# Patient Record
Sex: MALE | Race: WHITE
[De-identification: names, ages, dates, MRNs, and addresses within clinical notes are randomized per-mention and may not be internally consistent; named-entity substitution may affect disease eponyms.]

---

## 2017-01-26 ENCOUNTER — RESULT REVIEW (OUTPATIENT)
Age: 22
End: 2017-01-26

## 2017-01-30 ENCOUNTER — APPOINTMENT (OUTPATIENT)
Age: 22
End: 2017-01-30

## 2017-02-27 ENCOUNTER — APPOINTMENT (OUTPATIENT)
Dept: PULMONOLOGY | Facility: CLINIC | Age: 22
End: 2017-02-27

## 2017-02-27 VITALS
HEART RATE: 48 BPM | BODY MASS INDEX: 24.87 KG/M2 | RESPIRATION RATE: 17 BRPM | SYSTOLIC BLOOD PRESSURE: 120 MMHG | DIASTOLIC BLOOD PRESSURE: 80 MMHG | HEIGHT: 75 IN | OXYGEN SATURATION: 100 % | WEIGHT: 200 LBS

## 2017-02-27 RX ORDER — DESLORATADINE 5 MG/1
5 TABLET ORAL DAILY
Qty: 90 | Refills: 1 | Status: ACTIVE | COMMUNITY
Start: 2017-02-27 | End: 1900-01-01

## 2017-02-27 RX ORDER — TIOTROPIUM BROMIDE INHALATION SPRAY 3.12 UG/1
2.5 SPRAY, METERED RESPIRATORY (INHALATION) DAILY
Qty: 3 | Refills: 1 | Status: ACTIVE | COMMUNITY
Start: 2017-02-27 | End: 1900-01-01

## 2017-03-17 ENCOUNTER — APPOINTMENT (OUTPATIENT)
Dept: PULMONOLOGY | Facility: CLINIC | Age: 22
End: 2017-03-17

## 2017-04-11 ENCOUNTER — APPOINTMENT (OUTPATIENT)
Dept: PULMONOLOGY | Facility: CLINIC | Age: 22
End: 2017-04-11

## 2017-05-08 ENCOUNTER — APPOINTMENT (OUTPATIENT)
Dept: PULMONOLOGY | Facility: CLINIC | Age: 22
End: 2017-05-08

## 2017-05-08 VITALS
OXYGEN SATURATION: 100 % | HEART RATE: 48 BPM | SYSTOLIC BLOOD PRESSURE: 120 MMHG | DIASTOLIC BLOOD PRESSURE: 70 MMHG | BODY MASS INDEX: 25.67 KG/M2 | HEIGHT: 74 IN | WEIGHT: 200 LBS | RESPIRATION RATE: 17 BRPM

## 2017-05-08 RX ORDER — MONTELUKAST SODIUM 10 MG/1
10 TABLET, FILM COATED ORAL
Qty: 1 | Refills: 1 | Status: ACTIVE | COMMUNITY
Start: 2017-05-08 | End: 1900-01-01

## 2017-05-08 RX ORDER — DESLORATADINE 5 MG/1
5 TABLET ORAL DAILY
Qty: 90 | Refills: 1 | Status: ACTIVE | COMMUNITY
Start: 2017-05-08 | End: 1900-01-01

## 2017-05-08 RX ORDER — AMOXICILLIN AND CLAVULANATE POTASSIUM 875; 125 MG/1; MG/1
875-125 TABLET, COATED ORAL
Qty: 28 | Refills: 0 | Status: DISCONTINUED | COMMUNITY
Start: 2017-02-27 | End: 2017-05-08

## 2017-05-08 RX ORDER — BECLOMETHASONE DIPROPIONATE 80 UG/1
80 AEROSOL, METERED NASAL
Qty: 3 | Refills: 1 | Status: ACTIVE | COMMUNITY
Start: 2017-05-08 | End: 1900-01-01

## 2017-05-31 ENCOUNTER — APPOINTMENT (OUTPATIENT)
Dept: ALLERGY | Facility: CLINIC | Age: 22
End: 2017-05-31

## 2017-09-18 ENCOUNTER — APPOINTMENT (OUTPATIENT)
Dept: PULMONOLOGY | Facility: CLINIC | Age: 22
End: 2017-09-18
Payer: COMMERCIAL

## 2017-09-18 VITALS
DIASTOLIC BLOOD PRESSURE: 80 MMHG | WEIGHT: 200 LBS | RESPIRATION RATE: 17 BRPM | SYSTOLIC BLOOD PRESSURE: 110 MMHG | HEIGHT: 74 IN | HEART RATE: 48 BPM | OXYGEN SATURATION: 99 % | BODY MASS INDEX: 25.67 KG/M2

## 2017-09-18 PROCEDURE — 99214 OFFICE O/P EST MOD 30 MIN: CPT | Mod: 25

## 2017-09-18 PROCEDURE — 95012 NITRIC OXIDE EXP GAS DETER: CPT

## 2017-09-18 PROCEDURE — 94010 BREATHING CAPACITY TEST: CPT

## 2017-09-18 RX ORDER — RANITIDINE HYDROCHLORIDE 300 MG/1
300 CAPSULE ORAL
Qty: 1 | Refills: 1 | Status: ACTIVE | COMMUNITY
Start: 2017-09-18 | End: 1900-01-01

## 2018-01-29 ENCOUNTER — APPOINTMENT (OUTPATIENT)
Dept: PULMONOLOGY | Facility: CLINIC | Age: 23
End: 2018-01-29
Payer: COMMERCIAL

## 2018-01-29 VITALS
BODY MASS INDEX: 24.87 KG/M2 | OXYGEN SATURATION: 100 % | WEIGHT: 200 LBS | HEART RATE: 44 BPM | DIASTOLIC BLOOD PRESSURE: 70 MMHG | HEIGHT: 75 IN | SYSTOLIC BLOOD PRESSURE: 120 MMHG | RESPIRATION RATE: 17 BRPM

## 2018-01-29 PROCEDURE — 94010 BREATHING CAPACITY TEST: CPT

## 2018-01-29 PROCEDURE — 99214 OFFICE O/P EST MOD 30 MIN: CPT | Mod: 25

## 2018-01-29 RX ORDER — RANITIDINE 300 MG/1
300 TABLET ORAL
Qty: 1 | Refills: 1 | Status: ACTIVE | COMMUNITY
Start: 2018-01-29 | End: 1900-01-01

## 2018-06-11 ENCOUNTER — APPOINTMENT (OUTPATIENT)
Dept: PULMONOLOGY | Facility: CLINIC | Age: 23
End: 2018-06-11
Payer: COMMERCIAL

## 2018-06-11 ENCOUNTER — NON-APPOINTMENT (OUTPATIENT)
Age: 23
End: 2018-06-11

## 2018-06-11 VITALS
BODY MASS INDEX: 24.87 KG/M2 | OXYGEN SATURATION: 99 % | WEIGHT: 200 LBS | RESPIRATION RATE: 17 BRPM | SYSTOLIC BLOOD PRESSURE: 110 MMHG | HEIGHT: 75 IN | DIASTOLIC BLOOD PRESSURE: 80 MMHG | HEART RATE: 50 BPM

## 2018-06-11 DIAGNOSIS — J32.9 CHRONIC SINUSITIS, UNSPECIFIED: ICD-10-CM

## 2018-06-11 DIAGNOSIS — R05 COUGH: ICD-10-CM

## 2018-06-11 DIAGNOSIS — R53.82 CHRONIC FATIGUE, UNSPECIFIED: ICD-10-CM

## 2018-06-11 PROCEDURE — 94010 BREATHING CAPACITY TEST: CPT

## 2018-06-11 PROCEDURE — 95012 NITRIC OXIDE EXP GAS DETER: CPT

## 2018-06-11 PROCEDURE — 99214 OFFICE O/P EST MOD 30 MIN: CPT | Mod: 25

## 2018-06-11 RX ORDER — RANITIDINE 300 MG/1
300 TABLET ORAL
Qty: 90 | Refills: 1 | Status: ACTIVE | COMMUNITY
Start: 2018-06-11 | End: 1900-01-01

## 2018-06-11 RX ORDER — ROPINIROLE 0.5 MG/1
0.5 TABLET, FILM COATED ORAL
Qty: 1 | Refills: 5 | Status: ACTIVE | COMMUNITY
Start: 2018-06-11 | End: 1900-01-01

## 2019-01-30 ENCOUNTER — APPOINTMENT (OUTPATIENT)
Dept: PULMONOLOGY | Facility: CLINIC | Age: 24
End: 2019-01-30

## 2021-08-27 ENCOUNTER — APPOINTMENT (OUTPATIENT)
Dept: PULMONOLOGY | Facility: CLINIC | Age: 26
End: 2021-08-27
Payer: COMMERCIAL

## 2021-08-27 VITALS — HEIGHT: 74 IN | WEIGHT: 195 LBS | BODY MASS INDEX: 25.03 KG/M2

## 2021-08-27 DIAGNOSIS — G47.9 SLEEP DISORDER, UNSPECIFIED: ICD-10-CM

## 2021-08-27 PROCEDURE — 99214 OFFICE O/P EST MOD 30 MIN: CPT | Mod: 95

## 2021-08-27 RX ORDER — FAMOTIDINE 40 MG/1
40 TABLET, FILM COATED ORAL
Qty: 30 | Refills: 3 | Status: ACTIVE | COMMUNITY
Start: 2021-08-27 | End: 1900-01-01

## 2021-08-30 ENCOUNTER — APPOINTMENT (OUTPATIENT)
Dept: PULMONOLOGY | Facility: CLINIC | Age: 26
End: 2021-08-30

## 2021-08-31 DIAGNOSIS — E87.6 HYPOKALEMIA: ICD-10-CM

## 2021-08-31 LAB
ALBUMIN SERPL ELPH-MCNC: 5.1 G/DL
ALP BLD-CCNC: 80 U/L
ALT SERPL-CCNC: 10 U/L
ANION GAP SERPL CALC-SCNC: 17 MMOL/L
AST SERPL-CCNC: 18 U/L
BILIRUB SERPL-MCNC: 0.8 MG/DL
BUN SERPL-MCNC: 9 MG/DL
CALCIUM SERPL-MCNC: 9.9 MG/DL
CHLORIDE SERPL-SCNC: 101 MMOL/L
CO2 SERPL-SCNC: 24 MMOL/L
CREAT SERPL-MCNC: 0.83 MG/DL
CRP SERPL-MCNC: <3 MG/L
DEPRECATED D DIMER PPP IA-ACNC: <150 NG/ML DDU
ERYTHROCYTE [SEDIMENTATION RATE] IN BLOOD BY WESTERGREN METHOD: 4 MM/HR
FERRITIN SERPL-MCNC: 318 NG/ML
GLUCOSE SERPL-MCNC: 49 MG/DL
POTASSIUM SERPL-SCNC: 3.3 MMOL/L
PROCALCITONIN SERPL-MCNC: 0.03 NG/ML
PROT SERPL-MCNC: 7.8 G/DL
SODIUM SERPL-SCNC: 141 MMOL/L

## 2021-08-31 RX ORDER — POTASSIUM CHLORIDE 1.5 G/1.58G
20 POWDER, FOR SOLUTION ORAL DAILY
Qty: 3 | Refills: 0 | Status: ACTIVE | COMMUNITY
Start: 2021-08-31 | End: 1900-01-01

## 2021-09-01 PROBLEM — G47.9 DIFFICULTY SLEEPING: Status: ACTIVE | Noted: 2021-09-01

## 2021-09-01 NOTE — HISTORY OF PRESENT ILLNESS
[Home] : at home, [unfilled] , at the time of the visit. [Medical Office: ([facility])___] : at the medical office located in  [Verbal consent obtained from patient] : the patient, [unfilled] [FreeTextEntry1] : \par Mr. Askew is 26 year old male presenting to the office for a follow up visit for chronic cough, chronic fatigue, chronic sinusitis, dyspnea on effort,LPRD, moderate persistent asthma and snoring who is here via video due to +COVID19.\par \par Pt was vaccinated w/pfizer. \par Started to feel unwell on Tuesday- cold like symptoms, feverish.\par Reports he was with a friend that also was + on Tuesday 8/24. He was with the friend on Saturday.\par Current symptoms include tmax 100, mild cough, general fatigue, sinus congestion, and chest tightness that started yesterday- on and off. No true SOB.\par He admits to some post nasal drip. \par Some insomnia. \par No chest pain/pressure., sore throat, ear pain, GI issues. \par He has been off inhalers- has not needed.\par \par

## 2021-09-01 NOTE — PLAN
[FreeTextEntry1] : The plan for the patient is as follows:\par \par #1.COVID19 + as of 8/24\par -add COVID19 bundle labs for complete evaluation for homedraw/order sent; discussed indication of DDIMER testing as well as cbc,crp, ferritin, procalcitonin, CMP.\par -given hx of asthma- discussed the s/s that would warrant hospital visit/reporting to [facility] for AB infusion. No need at this time. \par -pt to keep me posted- call if worsening\par -Needs to QUARANTINE x 10 days from COVID19 + test\par Recommendations to patients with possible or confirmed COVID19:\par 1.	Stay home and away from public places. If you must go out, avoid using any kind of public transportation, ridesharing, or taxis.\par 2.	Monitor your symptoms carefully. If your symptoms get worse, call your healthcare provider immediately.\par 3.	Get rest and stay hydrated.\par 4.	Monitor temperature- treat with Tylenol 650 mg Q 6 hours up to 1000 mg TID-QID but not exceed 3500 mg daily for liver precautions. Avoid use of NSAIDs.\par 5.	Be sure to continue to take your maintenance medications for chronic conditions.\par 6. Consider purchasing pulse oximeter to monitor 02 status- discussed baseline values vs. deterioration\par As much as possible, stay in a specific room and away from other people in your home. Also, you should use a separate bathroom, if available. If you NEED to be around other people in or outside of the home, wear a facemask and stay 10 feet away.\par If you develop emergency warning signs for serious complications for COVID-19 get medical attention immediately. Emergency warning signs include*:\par -Trouble breathing/worsening- unresolved SOB\par -Persistent pain or pressure in the chest\par -New confusion or inability to arouse\par -Bluish lips or face\par -Worsening fatigue/malaise\par -Inability to eat or drink\par -High fever unresponsive to Tylenol\par Advised to keep us posted.\par \par #2.Hx of Asthma with chest tightness/mild cough\par -add Albuterol HFA 2 puffs as needed PRN SOB or cough its q6 hours\par -add Breo 200 1 puff once daily rinse and gargle\par -if worsening chest tightness or cough over the weekend, add dexamethasone 6mg x 10 days- discussed this in detail\par \par #3.Sinus congestion/PND\par -add Fluticasone nasal spray 1 spray am and pm\par -add OTC antihistamine\par \par #4.Prophylactic measures\par -add famotidine 40 mg x 2-3 weeks (hx of LPRD)\par -vit c, zinc, vit d\par -add full dose asa x 30 days\par \par #5.Insomnia- r/t covid stress\par -add melatonin gummy or supplement QHS\par \par Pt to follow up in 6 weeks. \par Pt to keep us posted on worsening\par we will call him with results\par \par

## 2021-09-16 ENCOUNTER — NON-APPOINTMENT (OUTPATIENT)
Age: 26
End: 2021-09-16

## 2021-09-16 ENCOUNTER — APPOINTMENT (OUTPATIENT)
Dept: PULMONOLOGY | Facility: CLINIC | Age: 26
End: 2021-09-16
Payer: COMMERCIAL

## 2021-09-16 VITALS
RESPIRATION RATE: 17 BRPM | WEIGHT: 188 LBS | DIASTOLIC BLOOD PRESSURE: 70 MMHG | HEART RATE: 53 BPM | BODY MASS INDEX: 24.13 KG/M2 | HEIGHT: 74 IN | OXYGEN SATURATION: 99 % | SYSTOLIC BLOOD PRESSURE: 120 MMHG | TEMPERATURE: 97 F

## 2021-09-16 DIAGNOSIS — U07.1 COVID-19: ICD-10-CM

## 2021-09-16 DIAGNOSIS — R05 COUGH: ICD-10-CM

## 2021-09-16 DIAGNOSIS — Z87.09 PERSONAL HISTORY OF OTHER DISEASES OF THE RESPIRATORY SYSTEM: ICD-10-CM

## 2021-09-16 PROCEDURE — 99214 OFFICE O/P EST MOD 30 MIN: CPT | Mod: 25

## 2021-09-16 PROCEDURE — 94010 BREATHING CAPACITY TEST: CPT

## 2021-09-16 RX ORDER — BENZONATATE 200 MG/1
200 CAPSULE ORAL 3 TIMES DAILY
Qty: 60 | Refills: 0 | Status: ACTIVE | COMMUNITY
Start: 2021-09-16 | End: 1900-01-01

## 2021-09-16 RX ORDER — ALBUTEROL SULFATE 90 UG/1
108 (90 BASE) AEROSOL, METERED RESPIRATORY (INHALATION)
Qty: 1 | Refills: 3 | Status: ACTIVE | COMMUNITY
Start: 2021-09-16 | End: 1900-01-01

## 2021-09-16 RX ORDER — OMEPRAZOLE 40 MG/1
40 CAPSULE, DELAYED RELEASE ORAL
Qty: 1 | Refills: 0 | Status: ACTIVE | COMMUNITY
Start: 2021-09-16 | End: 1900-01-01

## 2021-09-16 NOTE — PHYSICAL EXAM
[General Appearance - Well Developed] : well developed [Normal Appearance] : normal appearance [Well Groomed] : well groomed [General Appearance - Well Nourished] : well nourished [No Deformities] : no deformities [General Appearance - In No Acute Distress] : no acute distress [Normal Conjunctiva] : the conjunctiva exhibited no abnormalities [Eyelids - No Xanthelasma] : the eyelids demonstrated no xanthelasmas [II] : II [Neck Appearance] : the appearance of the neck was normal [Neck Cervical Mass (___cm)] : no neck mass was observed [Jugular Venous Distention Increased] : there was no jugular-venous distention [Thyroid Diffuse Enlargement] : the thyroid was not enlarged [Thyroid Nodule] : there were no palpable thyroid nodules [Heart Rate And Rhythm] : heart rate and rhythm were normal [Heart Sounds] : normal S1 and S2 [Murmurs] : no murmurs present [Respiration, Rhythm And Depth] : normal respiratory rhythm and effort [Exaggerated Use Of Accessory Muscles For Inspiration] : no accessory muscle use [Auscultation Breath Sounds / Voice Sounds] : lungs were clear to auscultation bilaterally [FreeTextEntry1] : I:E 1:3, clear  [Abdomen Soft] : soft [Abdomen Tenderness] : non-tender [Abdomen Mass (___ Cm)] : no abdominal mass palpated [Abnormal Walk] : normal gait [Gait - Sufficient For Exercise Testing] : the gait was sufficient for exercise testing [Nail Clubbing] : no clubbing of the fingernails [Cyanosis, Localized] : no localized cyanosis [Petechial Hemorrhages (___cm)] : no petechial hemorrhages [Skin Color & Pigmentation] : normal skin color and pigmentation [] : no rash [No Venous Stasis] : no venous stasis [Skin Lesions] : no skin lesions [No Skin Ulcers] : no skin ulcer [No Xanthoma] : no  xanthoma was observed [Deep Tendon Reflexes (DTR)] : deep tendon reflexes were 2+ and symmetric [Sensation] : the sensory exam was normal to light touch and pinprick [No Focal Deficits] : no focal deficits [Oriented To Time, Place, And Person] : oriented to person, place, and time [Impaired Insight] : insight and judgment were intact [Affect] : the affect was normal

## 2021-09-16 NOTE — REVIEW OF SYSTEMS
[Sore Throat] : no sore throat [Postnasal Drip] : postnasal drip [Cough] : cough [Chest Tightness] : chest tightness [Frequent URIs] : no frequent URIs [Sputum] : no sputum [Dyspnea] : no dyspnea [Wheezing] : no wheezing [SOB on Exertion] : no sob on exertion [GERD] : gerd [Negative] : Endocrine [TextBox_14] : sinuses are better than usual due to nasal spray

## 2021-09-16 NOTE — HISTORY OF PRESENT ILLNESS
[TextBox_4] : VISIT 8/27/2021:\par Mr. Askew is 26 year old male presenting to the office for a follow up visit for chronic cough, chronic fatigue, chronic sinusitis, dyspnea on effort, LPRD, moderate persistent asthma and snoring who is here via video due to +COVID19.\par \par Pt was vaccinated w/pfizer. \par Started to feel unwell on Tuesday- cold like symptoms, feverish.\par Reports he was with a friend that also was + on Tuesday 8/24. He was with the friend on Saturday.\par Current symptoms include tmax 100, mild cough, general fatigue, sinus congestion, and chest tightness that started yesterday- on and off. No true SOB.\par He admits to some post nasal drip. \par Some insomnia. \par No chest pain/pressure., sore throat, ear pain, GI issues. \par He has been off inhalers- has not needed.\par \par \par VISIT TODAY 9/16/2021:\par Pt is in for follow up for recent COVID19 and hx of asthma.\par He states that he is doing much better overall. \par He ended up taking steroids 10 days into the illness due to sensation of chest tightness. He felt it did help initially and then stopped working- it gave him SE of insomnia until getting off of it. \par He feels like he cannot get a deep breath in.\par He has residual cough. Cough has mostly been dry but now feels somewhat phlegmy. \par He admits to some post nasal drip. He is off antihistamine.\par He admits to GERD symptoms despite famotidine. \par He has 12 days of breo left. Has not been using his rescue inhaler at all. \par No other residual symptoms.

## 2021-09-16 NOTE — ASSESSMENT
[FreeTextEntry1] : Mr. Askew is 26 year old male presenting to the office for a follow up visit s/p COVID19 infection in August 2021. He is recovering well with some residual cough and chest tightness. \par The plan for the patient is as follows:\par \par #1. Post viral cough syndrome\par - can last 6 weeks\par - hydrate\par - completely treat asthma\par - treat reflux\par - treat post nasal drip\par \par #2. Asthma hx with chest tightness/mild cough\par -add Albuterol HFA 2 puffs as needed PRN SOB or cough its q6 hours\par -continue Breo 200 1 puff once daily rinse and gargle\par -add Spiriva respimat 2 puffs in am (pt was given 2 week sample)\par -once breo and spiriva run out- change to trelegy 200 ( 2 week sample given) 1 puff daily rinse and gargle\par \par #3.Sinus congestion (improved)/PND (continued)\par -add Fluticasone nasal spray 1 spray am and pm\par -add OTC antihistamine\par \par #4.GERD/LPR\par -add Omeprazole 40 po 30 min before breakfast\par -continue Famotidine 40 mg PO QHS\par \par Pt to follow up in 4 months\par call with any issues or if symptoms are not improving in the next 2 weeks. \par \par \par \par

## 2022-11-01 ENCOUNTER — NON-APPOINTMENT (OUTPATIENT)
Age: 27
End: 2022-11-01

## 2022-11-02 ENCOUNTER — APPOINTMENT (OUTPATIENT)
Dept: PULMONOLOGY | Facility: CLINIC | Age: 27
End: 2022-11-02

## 2022-11-02 PROCEDURE — 99213 OFFICE O/P EST LOW 20 MIN: CPT | Mod: 95

## 2022-11-02 RX ORDER — NEBULIZER ACCESSORIES
KIT MISCELLANEOUS
Qty: 1 | Refills: 0 | Status: ACTIVE | COMMUNITY
Start: 2022-11-02 | End: 1900-01-01

## 2022-11-02 RX ORDER — FLUTICASONE PROPIONATE 50 UG/1
50 SPRAY, METERED NASAL TWICE DAILY
Qty: 1 | Refills: 3 | Status: ACTIVE | COMMUNITY
Start: 2021-08-27 | End: 1900-01-01

## 2022-11-02 RX ORDER — FLUTICASONE FUROATE, UMECLIDINIUM BROMIDE AND VILANTEROL TRIFENATATE 200; 62.5; 25 UG/1; UG/1; UG/1
200-62.5-25 POWDER RESPIRATORY (INHALATION)
Qty: 1 | Refills: 0 | Status: ACTIVE | COMMUNITY
Start: 2022-11-02 | End: 1900-01-01

## 2022-11-02 NOTE — HISTORY OF PRESENT ILLNESS
[Never] : never [TextBox_4] : Mr. Askew is 27 year old male with history of chronic cough, chronic fatigue, chronic sinusitis, dyspnea on effort, LPRD, moderate persistent asthma and snoring who presents via video for cough.\par \par His chief concern is cough.\par \par Patient reports he was COVID positive via home test 10/27/2022.  He reports his current symptoms are dry cough, chest tightness, and slight postnasal drip.  Patient is currently not on any asthma inhalers or nebulizers.  He reports he does not have a nebulizer at home. Patent is requesting refill on all this medications. He reports he is currently taking Mucinex. \par \par Patient denies fever, chills, shortness of breath at rest or exertion, wheezing, nasal congestion, or runny nose. \par \par

## 2022-11-02 NOTE — ASSESSMENT
[FreeTextEntry1] : Mr. Askew is 27 year old male with history of chronic cough, chronic fatigue, chronic sinusitis, dyspnea on effort, LPRD, moderate persistent asthma and snoring who presents via video for cough.\par \par His chief concern is cough.\par \par 1. Cough\par -r/t asthma exacerbation.\par \par 2. Asthma\par -Add albuterol via nebulizer every 6 hours\par -Add Trelegy 200 1 puff once a day: Rinse and gargle after use\par \par 3.  Postnasal drip\par -Advised over-the-counter antihistamine\par -Add Flonase nasal spray 1 spray twice daily\par \par Patient to follow up in 2 weeks.\par Patient to call with further questions and concerns.\par Patient verbalizes understanding of care plan and is agreeable.\par

## 2022-11-02 NOTE — REASON FOR VISIT
[Home] : at home, [unfilled] , at the time of the visit. [Medical Office: ([facility])___] : at the medical office located in  [Patient] : the patient [Follow-Up] : a follow-up visit [Asthma] : asthma [Cough] : cough

## 2022-11-02 NOTE — REVIEW OF SYSTEMS
[Postnasal Drip] : postnasal drip [Cough] : cough [Chest Tightness] : chest tightness [Negative] : Psychiatric [Dry Eyes] : no dry eyes [Ear Disturbance] : no ear disturbance [Epistaxis] : no epistaxis [Sore Throat] : no sore throat [Eye Irritation] : no eye irritation [Nasal Congestion] : no nasal congestion [Dry Mouth] : no dry mouth [Sinus Problems] : no sinus problems [Mouth Ulcers] : no mouth ulcers [Poor Dentition] : no poor dentition [Edentulous] : no edentulous [Hemoptysis] : no hemoptysis [Frequent URIs] : no frequent URIs [Sputum] : no sputum [Dyspnea] : no dyspnea [Pleuritic Pain] : no pleuritic pain [Wheezing] : no wheezing [A.M. Dry Mouth] : no a.m. dry mouth [SOB on Exertion] : no sob on exertion

## 2022-11-09 ENCOUNTER — APPOINTMENT (OUTPATIENT)
Dept: PULMONOLOGY | Facility: CLINIC | Age: 27
End: 2022-11-09

## 2022-11-09 ENCOUNTER — NON-APPOINTMENT (OUTPATIENT)
Age: 27
End: 2022-11-09

## 2022-11-09 VITALS
TEMPERATURE: 97.7 F | HEIGHT: 75 IN | HEART RATE: 53 BPM | OXYGEN SATURATION: 98 % | BODY MASS INDEX: 23.62 KG/M2 | SYSTOLIC BLOOD PRESSURE: 120 MMHG | DIASTOLIC BLOOD PRESSURE: 68 MMHG | WEIGHT: 190 LBS | RESPIRATION RATE: 16 BRPM

## 2022-11-09 PROCEDURE — 99214 OFFICE O/P EST MOD 30 MIN: CPT | Mod: 25

## 2022-11-09 PROCEDURE — 94010 BREATHING CAPACITY TEST: CPT

## 2022-11-09 NOTE — HISTORY OF PRESENT ILLNESS
[Never] : never [TextBox_4] : Mr. Askew is 27 year old male with history of chronic cough, chronic fatigue, chronic sinusitis, dyspnea on effort, LPRD, moderate persistent asthma and snoring who presents to the office for sick visit.\par \par His chief concern is cough.\par \par Patient reports he wasn't able to get nebulizer device form [facility], haven't picked up nebulizer solution. He reports the Trelegy inhaler has $200 copay and alternative was sent which he hasn't picked up. \par Patient reports he is using Flonase nasal spray which is helping post nasal drip. He reports dry cough is slight better, but still experiencing some chest tightness. \par \par Patient denies fever, chills, SOB @ rest or exertion, wheezing, nasal congestion, runny nose or heartburn/reflux.\par \par \par

## 2022-11-09 NOTE — ASSESSMENT
[FreeTextEntry1] : Mr. Askew is 27 year old male with history of chronic cough, chronic fatigue, chronic sinusitis, dyspnea on effort, LPRD, moderate persistent asthma and snoring who presents to the office for sick visit.\par \par His chief concern is cough.\par \par 1. Cough\par -r/t asthma exacerbation\par \par 2. Asthma\par -start Albuterol via nebulizer Q6H PRN\par -start Advair 250 1 inhalation BID: rinse & gargle after use\par \par 3. Post nasal drip\par -continue over-the-counter antihistamine\par -continue Flonase nasal spray 1 spray twice daily\par \par Patient to follow up with Dr. Segura as scheduled.\par Patient to call with further questions and concerns.\par Patient verbalizes understanding of care plan and is agreeable.\par

## 2022-11-09 NOTE — PROCEDURE
[FreeTextEntry1] : SPI performed in office show mild airway obstruction \par FEV: 82\par FEV1/FVC Ratio: 83\par [id number]%: 66\par \par

## 2022-11-09 NOTE — PHYSICAL EXAM
[No Acute Distress] : no acute distress [Normal Oropharynx] : normal oropharynx [Normal Appearance] : normal appearance [Normal Rate/Rhythm] : normal rate/rhythm [Normal S1, S2] : normal s1, s2 [No Resp Distress] : no resp distress [Wheeze] : wheeze [No Abnormalities] : no abnormalities [Benign] : benign [Normal Color/ Pigmentation] : normal color/ pigmentation [No Focal Deficits] : no focal deficits [Oriented x3] : oriented x3 [Normal Affect] : normal affect

## 2023-01-09 ENCOUNTER — APPOINTMENT (OUTPATIENT)
Dept: PULMONOLOGY | Facility: CLINIC | Age: 28
End: 2023-01-09
Payer: COMMERCIAL

## 2023-01-09 VITALS
WEIGHT: 194 LBS | BODY MASS INDEX: 24.12 KG/M2 | SYSTOLIC BLOOD PRESSURE: 120 MMHG | HEART RATE: 75 BPM | RESPIRATION RATE: 16 BRPM | OXYGEN SATURATION: 99 % | TEMPERATURE: 97.6 F | DIASTOLIC BLOOD PRESSURE: 78 MMHG | HEIGHT: 75 IN

## 2023-01-09 DIAGNOSIS — R06.83 SNORING: ICD-10-CM

## 2023-01-09 DIAGNOSIS — J45.40 MODERATE PERSISTENT ASTHMA, UNCOMPLICATED: ICD-10-CM

## 2023-01-09 PROCEDURE — ZZZZZ: CPT

## 2023-01-09 PROCEDURE — 94729 DIFFUSING CAPACITY: CPT

## 2023-01-09 PROCEDURE — 94727 GAS DIL/WSHOT DETER LNG VOL: CPT

## 2023-01-09 PROCEDURE — 99214 OFFICE O/P EST MOD 30 MIN: CPT | Mod: 25

## 2023-01-09 PROCEDURE — 94010 BREATHING CAPACITY TEST: CPT

## 2023-01-09 PROCEDURE — 95012 NITRIC OXIDE EXP GAS DETER: CPT

## 2023-01-09 RX ORDER — FLUTICASONE FUROATE AND VILANTEROL TRIFENATATE 200; 25 UG/1; UG/1
200-25 POWDER RESPIRATORY (INHALATION) DAILY
Qty: 1 | Refills: 1 | Status: DISCONTINUED | COMMUNITY
Start: 2021-08-27 | End: 2023-01-09

## 2023-01-09 NOTE — HISTORY OF PRESENT ILLNESS
[FreeTextEntry1] : Mr. Askew is 27 year old male presenting to the office for a follow up visit for chronic cough, chronic fatigue, chronic sinusitis, dyspnea on effort,LPRD, moderate persistent asthma  and snoring. His chief complaint is poor sleep\par \par -he notes feeling generally well \par -he notes sniffling a lot\par -he notes on Advair and Nasonex which has significantly improved Sx\par -he notes exercising (basketball 2-3x a week) no limitations \par -he notes sleep interrupted by nocturia\par -he denies taking any new medications, vitamins, or supplements \par -he notes S/p Covid-19 2021 and 10/2022\par -he notes bowels are regular \par \par -he denies any headaches, nausea, emesis, fever, chills, sweats, chest pain, chest pressure, coughing, wheezing, palpitations, constipation, diarrhea, vertigo, dysphagia, heartburn, reflux, itchy eyes, itchy ears, leg swelling, leg pain, arthralgias, myalgias, or sour taste in the mouth.

## 2023-01-09 NOTE — PROCEDURE
[FreeTextEntry1] : Full PFT reveals normal flows; FEV1 was 5.42L which is 101% of predicted; normal lung volumes; normal diffusion at 33.9, which is 100% of predicted; normal flow volume loop.\par \par FENO was 34; a normal value being less than 25\par Fractional exhaled nitric oxide (FENO) is regarded as a simple, noninvasive method for assessing eosinophilic airway inflammation. Produced by a variety of cells within the lung, nitric oxide (NO) concentrations are generally low in healthy individuals. However, high concentrations of NO appear to be involved in nonspecific host defense mechanisms and chronic inflammatory diseases such as asthma. The American Thoracic Society (ATS) therefore has recommended using FENO to aid in the diagnosis and monitoring of eosinophilic airway inflammation and asthma, and for identifying steroid responsive individuals whose chronic respiratory symptoms may be caused by airway inflammation.

## 2023-01-09 NOTE — ASSESSMENT
[FreeTextEntry1] : Mr. Askew is 27 year old male presenting to the office for a follow up visit for chronic cough, chronic fatigue, chronic sinusitis, dyspnea on effort,LPRD, moderate persistent asthma  and snoring. - S/p Covid-19 10/2021, 10/2022 - now stable \par \par His chronic cough is controlled, though multifactorial due to:\par -post nasal drip syndrome\par -moderate persistent asthma\par \par problem 1: post nasal drip syndrome \par -continue to use [employer] nasal saline\par -followed by Qnasl 1 sniff each nostril BID\par -followed by Olopatadine 1 sniff each nostril BID (.6)\par -continue Clarinex 5 mg before bed\par \par -Environmental measures for allergies were encouraged including mattress and pillow cover, air purifier, and environmental controls.\par \par problem 2: moderate persistent asthma\par -he is to continue ProAir PRN and before exercise\par -continue to use Singulair 10 mg before bed\par -add Advair 250 1 inhalation BID \par -contniue Spiriva Respimat 2 inhalations QD\par \par -Asthma is believed to be caused by inherited (genetic) and environmental factor, but its exact cause is unknown. Asthma may be triggered by allergens, lung infections, or irritants in the air. Asthma triggers are different for each person\par -Inhaler technique reviewed as well as oral hygiene techniques reviewed with patient. Avoidance of cold air, extremes of temperature, rescue inhaler should be used before exercise. Order of medication reviewed with patient. Recommended use of a cool mist humidifier in the bedroom.\par \par problem 3: primary snoring\par -it is felt to be related to the position of his sleep\par \par problem 4: LPR/Globus (GERD)\par -he is being recommended to chew on DGL before meals, specifically breakfast and dinner \par -increase to Zantac 300 mg QHS\par -recommended to try IB-Isabelle\par -Rule of 2s: avoid eating too much, eating too late, eating too spicy, eating two hours before bed\par -Things to avoid including overeating, spicy foods, tight clothing, eating within three hours of bed, this list is not all inclusive. \par -For treatment of reflux, possible options discussed including diet control, H2 blockers, PPIs, as well as coating motility agents discussed as treatment options. Timing of meals and proximity of last meal to sleep were discussed. If symptoms persist, a formal gastrointestinal evaluation is needed. \par \par problem 5: RLS\par -add Requip 0.5 mg QHS\par -Restless Legs Syndrome (RLS), also known as Reyes-Ekbom Disease, is a common sleep -related movement disorder. About 1 in 10 adults in the [place] have problems from restless leg syndrome. It also can be seen in about 2% of children. Women are twice as likely as men to have RLS. People with RLS will have symptoms  most often during times when they are less active, especially at bedtime. RLS most often causes an overwhelming urge to move your legs and sometimes other parts of your body. This urge is associated with unpleasant sensations in different parts of th body. The symptoms can be mild to severe and can affect your ability to go to sleep and stay asleep. People with RLS often sleep less at night and feel more tired during the day. \par \par problem 6 : health maintenance \par -S/p Covid-19 10/2021, 10/2022\par -recommended to try IB-Isabelle for bowel issues\par -recommended influenza vaccination during flu season\par -recommended strep pneumonia vaccines: Prevnar-13 vaccine, followed by Pneumo vaccine 23 one year following\par -recommended early intervention for URIs\par -recommended regular osteoporosis evaluations\par -recommended early dermatological evaluations\par -recommended after the age of 50 to the age of 70, colonoscopy every 5 years \par  \par \par F/U in 4 months with full spirometry and NiOx\par He is encouraged to call with any changes, concerns, or questions.

## 2023-01-09 NOTE — REASON FOR VISIT
[Follow-Up] : a follow-up visit [Parent] : parent [FreeTextEntry1] : chronic cough, chronic fatigue, chronic sinusitis, dyspnea on effort,LPRD, moderate persistent asthma  and snoring

## 2023-07-10 ENCOUNTER — APPOINTMENT (OUTPATIENT)
Dept: PULMONOLOGY | Facility: CLINIC | Age: 28
End: 2023-07-10

## 2023-07-27 ENCOUNTER — NON-APPOINTMENT (OUTPATIENT)
Age: 28
End: 2023-07-27

## 2023-07-27 ENCOUNTER — APPOINTMENT (OUTPATIENT)
Dept: PULMONOLOGY | Facility: CLINIC | Age: 28
End: 2023-07-27
Payer: COMMERCIAL

## 2023-07-27 VITALS
OXYGEN SATURATION: 98 % | HEART RATE: 60 BPM | DIASTOLIC BLOOD PRESSURE: 76 MMHG | HEIGHT: 75 IN | TEMPERATURE: 97.3 F | SYSTOLIC BLOOD PRESSURE: 130 MMHG | BODY MASS INDEX: 23.62 KG/M2 | WEIGHT: 190 LBS | RESPIRATION RATE: 16 BRPM

## 2023-07-27 DIAGNOSIS — R06.02 SHORTNESS OF BREATH: ICD-10-CM

## 2023-07-27 DIAGNOSIS — G25.81 RESTLESS LEGS SYNDROME: ICD-10-CM

## 2023-07-27 DIAGNOSIS — J45.909 UNSPECIFIED ASTHMA, UNCOMPLICATED: ICD-10-CM

## 2023-07-27 PROCEDURE — 94010 BREATHING CAPACITY TEST: CPT

## 2023-07-27 PROCEDURE — 99214 OFFICE O/P EST MOD 30 MIN: CPT | Mod: 25

## 2023-07-27 PROCEDURE — 95012 NITRIC OXIDE EXP GAS DETER: CPT

## 2023-07-27 RX ORDER — DEXAMETHASONE 6 MG/1
6 TABLET ORAL DAILY
Qty: 10 | Refills: 0 | Status: DISCONTINUED | COMMUNITY
Start: 2021-08-27 | End: 2023-07-27

## 2023-07-27 NOTE — PROCEDURE
[FreeTextEntry1] : PFT reveals normal flows, with an FEV1 of 4.73 L, which is 92% of predicted, with a normal flow volume loop.\par PFTs were performed to evaluate for asthma \par \par FENO was 29; a normal value being less than 25\par Fractional exhaled nitric oxide (FENO) is regarded as a simple, noninvasive method for assessing eosinophilic airway inflammation. Produced by a variety of cells within the lung, nitric oxide (NO) concentrations are generally low in healthy individuals. However, high concentrations of NO appear to be involved in nonspecific host defense mechanisms and chronic inflammatory diseases such as asthma. The American Thoracic Society (ATS) therefore has recommended using FENO to aid in the diagnosis and monitoring of eosinophilic airway inflammation and asthma, and for identifying steroid responsive individuals whose chronic respiratory symptoms may be caused by airway inflammation.

## 2023-07-27 NOTE — ADDENDUM
[FreeTextEntry1] : Documented by Lita Marques acting as a scribe for Dr. Rocael Segura on 07/27/2023 .\par \par All medical record entries made by the Scribe were at my, Dr. Rocael Segura's, direction and personally dictated by me on 07/27/2023. I have reviewed the chart and agree that the record accurately reflects my personal performance of the history, physical exam, assessment and plan. I have also personally directed, reviewed, and agree with the discharge instructions.

## 2023-07-27 NOTE — HISTORY OF PRESENT ILLNESS
[FreeTextEntry1] : Mr. Askew is 28 year old male presenting to the office for a follow up visit for chronic cough, chronic fatigue, chronic sinusitis, dyspnea on effort, LPRD, moderate persistent asthma  and snoring. His chief complaint is poor sleep\par \par -he notes chest tightness onset 10 day\par -he notes mild PND \par -he notes chest pressure induced a unproductive cough \par -he notes bowels are regular \par -he notes heartburn and reflux has been more active \par -he notes energy level is 5-6/10\par -he denies dust or construction exposure \par -he notes vision is stable \par -he notes poor sleep \par -he notes issues initiating sleep\par -he notes use of nebulizer and advair BID \par \par -he denies any headaches, nausea, emesis, fever, chills, sweats, chest pain, wheezing, palpitations, constipation, diarrhea, vertigo, dysphagia, itchy eyes, itchy ears, leg swelling, arthralgias, myalgias, or sour taste in the mouth.

## 2023-07-27 NOTE — ASSESSMENT
[FreeTextEntry1] : Mr. Askew is 28 year old male presenting to the office for a follow up visit for chronic cough, chronic fatigue, chronic sinusitis, dyspnea on effort,LPRD, moderate persistent asthma  and snoring. - S/p Covid-19 10/2021, 10/2022 - active asthma \par \par His chronic cough is controlled, though multifactorial due to:\par -post nasal drip syndrome\par -moderate persistent asthma\par \par problem 1: moderate persistent asthma\par -add Prednisone 20 mg x 7 days, 10 mg x 7 days \par -he is to continue ProAir PRN and before exercise\par -continue to use Singulair 10 mg before bed\par -continue Advair 250 1 inhalation BID \par -continue Spiriva Respimat 2 inhalations QD\par -Information sheet given to the patient to be reviewed, this medication is never to be used without consulting the prescribing physician. Proper dietary restraint is necessary specifically salt containing foods, if any reaction may occur should be reported. \par -Asthma is believed to be caused by inherited (genetic) and environmental factor, but its exact cause is unknown. Asthma may be triggered by allergens, lung infections, or irritants in the air. Asthma triggers are different for each person\par -Inhaler technique reviewed as well as oral hygiene techniques reviewed with patient. Avoidance of cold air, extremes of temperature, rescue inhaler should be used before exercise. Order of medication reviewed with patient. Recommended use of a cool mist humidifier in the bedroom.\par \par problem 2: post nasal drip syndrome \par -continue to use Arm and Hammer nasal saline\par -followed by Qnasl 1 sniff each nostril BID\par -followed by Olopatadine 1 sniff each nostril BID (.6)\par -continue Clarinex 5 mg before bed\par -Environmental measures for allergies were encouraged including mattress and pillow cover, air purifier, and environmental controls.\par \par problem 3: primary snoring\par -it is felt to be related to the position of his sleep\par \par problem 4: LPR/Globus (GERD)\par -recommended reflux gourmet \par -he is being recommended to chew on DGL before meals, specifically breakfast and dinner \par -increase to Zantac 300 mg QHS\par -recommended to try IB-Isabelle\par -Rule of 2s: avoid eating too much, eating too late, eating too spicy, eating two hours before bed\par -Things to avoid including overeating, spicy foods, tight clothing, eating within three hours of bed, this list is not all inclusive. \par -For treatment of reflux, possible options discussed including diet control, H2 blockers, PPIs, as well as coating motility agents discussed as treatment options. Timing of meals and proximity of last meal to sleep were discussed. If symptoms persist, a formal gastrointestinal evaluation is needed. \par \par problem 5: RLS\par -continue Requip 0.5 mg QHS\par -Restless Legs Syndrome (RLS), also known as Reyes-Ekbom Disease, is a common sleep -related movement disorder. About 1 in 10 adults in the [place] have problems from restless leg syndrome. It also can be seen in about 2% of children. Women are twice as likely as men to have RLS. People with RLS will have symptoms  most often during times when they are less active, especially at bedtime. RLS most often causes an overwhelming urge to move your legs and sometimes other parts of your body. This urge is associated with unpleasant sensations in different parts of th body. The symptoms can be mild to severe and can affect your ability to go to sleep and stay asleep. People with RLS often sleep less at night and feel more tired during the day. \par \par problem 6 : health maintenance \par -S/p Covid-19 10/2021, 10/2022\par -recommended to try IB-Isabelle for bowel issues\par -recommended influenza vaccination during flu season\par -recommended strep pneumonia vaccines: Prevnar-13 vaccine, followed by Pneumo vaccine 23 one year following\par -recommended early intervention for URIs\par -recommended regular osteoporosis evaluations\par -recommended early dermatological evaluations\par -recommended after the age of 50 to the age of 70, colonoscopy every 5 years \par \par F/U in 4 months with full spirometry and NiOx\par He is encouraged to call with any changes, concerns, or questions.

## 2024-02-28 ENCOUNTER — APPOINTMENT (OUTPATIENT)
Dept: PULMONOLOGY | Facility: CLINIC | Age: 29
End: 2024-02-28
Payer: COMMERCIAL

## 2024-02-28 ENCOUNTER — NON-APPOINTMENT (OUTPATIENT)
Age: 29
End: 2024-02-28

## 2024-02-28 DIAGNOSIS — R05.9 COUGH, UNSPECIFIED: ICD-10-CM

## 2024-02-28 DIAGNOSIS — R07.89 OTHER CHEST PAIN: ICD-10-CM

## 2024-02-28 DIAGNOSIS — K21.9 GASTRO-ESOPHAGEAL REFLUX DISEASE W/OUT ESOPHAGITIS: ICD-10-CM

## 2024-02-28 DIAGNOSIS — R06.09 OTHER FORMS OF DYSPNEA: ICD-10-CM

## 2024-02-28 DIAGNOSIS — R09.82 POSTNASAL DRIP: ICD-10-CM

## 2024-02-28 PROCEDURE — 99214 OFFICE O/P EST MOD 30 MIN: CPT

## 2024-02-28 RX ORDER — ALBUTEROL SULFATE 2.5 MG/3ML
(2.5 MG/3ML) SOLUTION RESPIRATORY (INHALATION) EVERY 6 HOURS
Qty: 1 | Refills: 2 | Status: ACTIVE | COMMUNITY
Start: 2022-11-02 | End: 1900-01-01

## 2024-02-28 RX ORDER — PREDNISONE 10 MG/1
10 TABLET ORAL
Qty: 21 | Refills: 0 | Status: ACTIVE | COMMUNITY
Start: 2023-07-27 | End: 1900-01-01

## 2024-02-28 RX ORDER — FLUTICASONE PROPIONATE AND SALMETEROL 250; 50 UG/1; UG/1
250-50 POWDER RESPIRATORY (INHALATION)
Qty: 1 | Refills: 3 | Status: ACTIVE | COMMUNITY
Start: 2022-11-09 | End: 1900-01-01

## 2024-02-28 RX ORDER — ALBUTEROL SULFATE 90 UG/1
108 (90 BASE) INHALANT RESPIRATORY (INHALATION)
Qty: 1 | Refills: 3 | Status: ACTIVE | COMMUNITY
Start: 2021-09-16 | End: 1900-01-01

## 2024-02-28 NOTE — REVIEW OF SYSTEMS
[Postnasal Drip] : postnasal drip [Cough] : cough [Chest Tightness] : chest tightness [SOB on Exertion] : sob on exertion [Seasonal Allergies] : seasonal allergies [GERD] : gerd [Negative] : Endocrine

## 2024-02-28 NOTE — HISTORY OF PRESENT ILLNESS
[FreeTextEntry1] : Mr. Askew is 28 year old male with chronic cough, chronic fatigue, chronic sinusitis, dyspnea on effort, LPRD, moderate persistent asthma and snoring.    Presented for telehealth visit for acute visit- COVID 19 positive   LAST VISIT 7/27/2023 -he notes chest tightness onset 10 day -he notes mild PND  -he notes chest pressure induced a unproductive cough  -he notes bowels are regular  -he notes heartburn and reflux has been more active  -he notes energy level is 5-6/10 -he denies dust or construction exposure  -he notes vision is stable  -he notes poor sleep  -he notes issues initiating sleep -he notes use of nebulizer and advair BID   -he denies any headaches, nausea, emesis, fever, chills, sweats, chest pain, wheezing, palpitations, constipation, diarrhea, vertigo, dysphagia, itchy eyes, itchy ears, leg swelling, arthralgias, myalgias, or sour taste in the mouth.   TODAY'S VISIT 2/28/24- acute visit COVID positive  -reports he had a cold 3 weeks prior with symptoms of runny nose, sneezing, no fever. -reports cold symptoms resolved and then started having symptoms of sore throat, mild nasal congestion that progressed to SOB and chest tightness; tested positive for COVID 2/20/24 -reports he has been using inhalers and nebulizer and symptoms improved -reports he had COVID 3 x -reports he has had Prednisone in the past and the higher doses cause him. -reports he feels globus sensation at times, clears throat, not taking reflux meds -takes OTC Flonase nasal spray as needed -reports he is currently working with Sleep Specialist from [facility] and is on Zaleplon medication for insomnia which he finds helpful- sleeping 6-7 hours/night -reports he is waiting for HST- followed by [facility]

## 2024-02-28 NOTE — REASON FOR VISIT
[Home] : at home, [unfilled] , at the time of the visit. [Medical Office: ([facility])___] : at the medical office located in  [Patient] : the patient [Acute] : an acute visit [FreeTextEntry1] : s/p COVID 19 2/20/24, nonproductive cough, SOB, chest tightness, moderate persistent asthma  and snoring

## 2024-02-28 NOTE — ASSESSMENT
[FreeTextEntry1] : Mr. Askew is 28 year old male h/o chronic cough, chronic fatigue, chronic sinusitis, dyspnea on effort,LPRD, moderate persistent asthma  and snoring. - S/p Covid-19 10/2021, 10/2022   His cough multifactorial due to: -COVID 19 -post nasal drip syndrome -moderate persistent asthma  problem 1: Cough r/t recent COVID 19 infection and asthma -add Prednisone 20 mg x 7 days, 10 mg x 7 days  -Continue ProAir PRN and before exercise -Continue Advair 250 1 inhalation BID (rinse and gargle after use)  -Continue Spiriva Respimat 2 inhalations QD -Continue albuterol nebulizer up to 4xday as needed for sob -Asthma is believed to be caused by inherited (genetic) and environmental factor, but its exact cause is unknown. Asthma may be triggered by allergens, lung infections, or irritants in the air. Asthma triggers are different for each person  problem 2: post nasal drip syndrome  -Add Azelastine nasal spray 1 spray BID as needed -OTC Flonase nasal spray as needed -Continue Neti pot -Environmental measures for allergies were encouraged including mattress and pillow cover, air purifier, and environmental controls.  problem 3: LPR/Globus (GERD) -diet controlled; not taking meds -Rule of 2s: avoid eating too much, eating too late, eating too spicy, eating two hours before bed -Things to avoid including overeating, spicy foods, tight clothing, eating within three hours of bed, this list is not all inclusive.  -For treatment of reflux, possible options discussed including diet control, H2 blockers, PPIs, as well as coating motility agents discussed as treatment options. Timing of meals and proximity of last meal to sleep were discussed. If symptoms persist, a formal gastrointestinal evaluation is needed.   problem 4: Insomnia -taking Zalepon -followed by Sleep specialist at [facility]  F/U in4-6 months with Dr. Segura with PFTs He is encouraged to call with any changes, concerns, or questions.

## 2024-04-01 RX ORDER — TIOTROPIUM BROMIDE INHALATION SPRAY 3.12 UG/1
2.5 SPRAY, METERED RESPIRATORY (INHALATION) DAILY
Qty: 3 | Refills: 1 | Status: ACTIVE | COMMUNITY
Start: 2023-07-27 | End: 1900-01-01

## 2024-07-29 ENCOUNTER — APPOINTMENT (OUTPATIENT)
Dept: PULMONOLOGY | Facility: CLINIC | Age: 29
End: 2024-07-29
Payer: COMMERCIAL

## 2024-07-29 VITALS
DIASTOLIC BLOOD PRESSURE: 70 MMHG | BODY MASS INDEX: 23.62 KG/M2 | RESPIRATION RATE: 16 BRPM | HEIGHT: 75 IN | TEMPERATURE: 97.6 F | WEIGHT: 190 LBS | HEART RATE: 54 BPM | OXYGEN SATURATION: 98 % | SYSTOLIC BLOOD PRESSURE: 128 MMHG

## 2024-07-29 DIAGNOSIS — K21.9 GASTRO-ESOPHAGEAL REFLUX DISEASE W/OUT ESOPHAGITIS: ICD-10-CM

## 2024-07-29 DIAGNOSIS — J30.89 OTHER ALLERGIC RHINITIS: ICD-10-CM

## 2024-07-29 DIAGNOSIS — R06.02 SHORTNESS OF BREATH: ICD-10-CM

## 2024-07-29 DIAGNOSIS — J32.9 CHRONIC SINUSITIS, UNSPECIFIED: ICD-10-CM

## 2024-07-29 DIAGNOSIS — Z86.39 PERSONAL HISTORY OF OTHER ENDOCRINE, NUTRITIONAL AND METABOLIC DISEASE: ICD-10-CM

## 2024-07-29 DIAGNOSIS — R06.83 SNORING: ICD-10-CM

## 2024-07-29 DIAGNOSIS — J45.40 MODERATE PERSISTENT ASTHMA, UNCOMPLICATED: ICD-10-CM

## 2024-07-29 DIAGNOSIS — G25.81 RESTLESS LEGS SYNDROME: ICD-10-CM

## 2024-07-29 PROCEDURE — 94010 BREATHING CAPACITY TEST: CPT

## 2024-07-29 PROCEDURE — 94729 DIFFUSING CAPACITY: CPT

## 2024-07-29 PROCEDURE — 99214 OFFICE O/P EST MOD 30 MIN: CPT | Mod: 25

## 2024-07-29 PROCEDURE — 95012 NITRIC OXIDE EXP GAS DETER: CPT

## 2024-07-29 PROCEDURE — 94727 GAS DIL/WSHOT DETER LNG VOL: CPT

## 2024-07-29 RX ORDER — BUDESONIDE, GLYCOPYRROLATE, AND FORMOTEROL FUMARATE 160; 9; 4.8 UG/1; UG/1; UG/1
160-9-4.8 AEROSOL, METERED RESPIRATORY (INHALATION)
Qty: 3 | Refills: 1 | Status: ACTIVE | COMMUNITY
Start: 2024-07-29 | End: 1900-01-01

## 2024-07-29 NOTE — PROCEDURE
[FreeTextEntry1] : Full PFT reveals moderate obstructive dysfunction at mid to low lung volumes; FEV1 was  5.31L which is 78% of predicted; normal lung volumes; normal diffusion at 36.3, which is 109% of predicted; normal flow volume loop.  PFTs were performed to evaluate for SOB, asthma  FENO was 41; a normal value being less than 25 Fractional exhaled nitric oxide (FENO) is regarded as a simple, noninvasive method for assessing eosinophilic airway inflammation. Produced by a variety of cells within the lung, nitric oxide (NO) concentrations are generally low in healthy individuals. However, high concentrations of NO appear to be involved in nonspecific host defense mechanisms and chronic inflammatory diseases such as asthma. The American Thoracic Society (ATS) therefore has recommended using FENO to aid in the diagnosis and monitoring of eosinophilic airway inflammation and asthma, and for identifying steroid responsive individuals whose chronic respiratory symptoms may be caused by airway inflammation.

## 2024-07-29 NOTE — ADDENDUM
[FreeTextEntry1] : Documented by Mendel Naik acting as a scribe for Dr. Rocael Segura on 07/29/2024. All medical record entries made by the Scribe were at my, Dr. Rocael Segura's, direction and personally dictated by me on 07/29/2024. I have reviewed the chart and agree that the record accurately reflects my personal performance of the history, physical exam, assessment and plan. I have also personally directed, reviewed, and agree with the discharge instructions.

## 2024-07-29 NOTE — REASON FOR VISIT
[Follow-Up] : a follow-up visit [FreeTextEntry1] : chronic cough, chronic fatigue, chronic sinusitis, dyspnea on effort,LPRD, moderate persistent asthma  and snoring

## 2024-07-29 NOTE — HISTORY OF PRESENT ILLNESS
[FreeTextEntry1] : Mr. Askew is 29 year old male presenting to the office for a follow up visit for chronic cough, chronic fatigue, chronic sinusitis, dyspnea on effort, LPRD, moderate persistent asthma  and snoring. His chief complaint is poor sleep  -he notes feeling fatigued -he notes sleeping for 3-7 hours, interrupted sleep due to anxiety -he notes intermittent chest pain due to asthma and chest infections, related Sx include PNDrip -he notes bowels are regular -he denies dysphonia -he notes not getting enough sleep -he notes snoring -he denies any witnessed apneas -he notes rare heartburn depending on diet -he notes COVID-19 x3 (10/2021, 10/2022, 2/2024)  -he denies any headaches, nausea, emesis, fever, chills, sweats, chest pain, chest pressure, coughing, wheezing, palpitations, diarrhea, constipation, dysphagia, vertigo, arthralgias, myalgias, leg swelling, itchy eyes, itchy ears, heartburn, reflux, or sour taste in the mouth.

## 2024-07-29 NOTE — ASSESSMENT
[FreeTextEntry1] : Mr. Askew is 29 year old male presenting to the office for a follow up visit for chronic cough 2/2024, chronic fatigue, chronic sinusitis, dyspnea on effort,LPRD, moderate persistent asthma  and snoring. - S/p Covid-19 10/2021, 10/2022, 2/2024 - active asthma - sleep study, now with asthma Sx; sinus PNDrip  His chronic cough is controlled, though multifactorial due to: -post nasal drip syndrome -moderate persistent asthma  problem 1: moderate persistent asthma - ?asthma -s/p Prednisone 20 mg x 7 days, 10 mg x 7 days 7/2023 -he is to continue ProAir PRN and before exercise -continue to use Singulair 10 mg before bed -continue Advair 250 1 inhalation BID  -continue Spiriva Respimat 2 inhalations QD -add Breztri 2 puffs BID -Information sheet given to the patient to be reviewed, this medication is never to be used without consulting the prescribing physician. Proper dietary restraint is necessary specifically salt containing foods, if any reaction may occur should be reported.  -Asthma is believed to be caused by inherited (genetic) and environmental factor, but its exact cause is unknown. Asthma may be triggered by allergens, lung infections, or irritants in the air. Asthma triggers are different for each person -Inhaler technique reviewed as well as oral hygiene techniques reviewed with patient. Avoidance of cold air, extremes of temperature, rescue inhaler should be used before exercise. Order of medication reviewed with patient. Recommended use of a cool mist humidifier in the bedroom.  problem 2: post nasal drip syndrome  -continue to use Arm and Hammer nasal saline; Navage -followed by Qnasl 1 sniff each nostril BID -followed by Olopatadine 1 sniff each nostril BID (.6) -continue Clarinex 5 mg before bed -add Allegra 180 mg QAM -Environmental measures for allergies were encouraged including mattress and pillow cover, air purifier, and environmental controls.  problem 3: primary snoring -it is felt to be related to the position of his sleep  problem 4: LPR/Globus (GERD) -recommended reflux gourmet  -he is being recommended to chew on DGL before meals, specifically breakfast and dinner  -add Pepcid 40 mg QHS -recommended to try IB-Isabelle -Rule of 2s: avoid eating too much, eating too late, eating too spicy, eating two hours before bed -Things to avoid including overeating, spicy foods, tight clothing, eating within three hours of bed, this list is not all inclusive.  -For treatment of reflux, possible options discussed including diet control, H2 blockers, PPIs, as well as coating motility agents discussed as treatment options. Timing of meals and proximity of last meal to sleep were discussed. If symptoms persist, a formal gastrointestinal evaluation is needed.   problem 5: RLS -continue Requip 0.5 mg QHS, iron studies -Restless Legs Syndrome (RLS), also known as Reyes-Ekbom Disease, is a common sleep -related movement disorder. About 1 in 10 adults in the [place] have problems from restless leg syndrome. It also can be seen in about 2% of children. Women are twice as likely as men to have RLS. People with RLS will have symptoms  most often during times when they are less active, especially at bedtime. RLS most often causes an overwhelming urge to move your legs and sometimes other parts of your body. This urge is associated with unpleasant sensations in different parts of th body. The symptoms can be mild to severe and can affect your ability to go to sleep and stay asleep. People with RLS often sleep less at night and feel more tired during the day.   Problem 5A: ?OSAS (2017 HSS) -complete home sleep study -Sleep apnea is associated with adverse clinical consequences which can affect most organ systems. Cardiovascular disease risk includes arrhythmias, atrial fibrillation, hypertension, coronary artery disease, and stroke. Metabolic disorders include diabetes type 2, non-alcoholic fatty liver disease. Mood disorder especially depression; and cognitive decline especially in the elderly. Associations with chronic reflux/Vasques's esophagus some but not all inclusive.  -Reasons include arousal consistent with hypopnea; respiratory events most prominent in REM sleep or supine position; therefore sleep staging and body position are important for accurate diagnosis and estimation of AHI.  problem 6 : health maintenance  -S/p Covid-19 10/2021, 10/2022 -recommended to try IB-Isabelle for bowel issues -recommended influenza vaccination during flu season -recommended strep pneumonia vaccines: Prevnar-13 vaccine, followed by Pneumo vaccine 23 one year following -recommended early intervention for URIs -recommended regular osteoporosis evaluations -recommended early dermatological evaluations -recommended after the age of 50 to the age of 70, colonoscopy every 5 years   F/U in 4 months with full spirometry and NiOx He is encouraged to call with any changes, concerns, or questions.

## 2024-11-04 ENCOUNTER — APPOINTMENT (OUTPATIENT)
Dept: PULMONOLOGY | Facility: CLINIC | Age: 29
End: 2024-11-04

## 2024-12-18 ENCOUNTER — APPOINTMENT (OUTPATIENT)
Dept: PULMONOLOGY | Facility: CLINIC | Age: 29
End: 2024-12-18
Payer: COMMERCIAL

## 2024-12-18 DIAGNOSIS — J32.9 CHRONIC SINUSITIS, UNSPECIFIED: ICD-10-CM

## 2024-12-18 DIAGNOSIS — G25.81 RESTLESS LEGS SYNDROME: ICD-10-CM

## 2024-12-18 DIAGNOSIS — R09.82 POSTNASAL DRIP: ICD-10-CM

## 2024-12-18 DIAGNOSIS — K21.9 GASTRO-ESOPHAGEAL REFLUX DISEASE W/OUT ESOPHAGITIS: ICD-10-CM

## 2024-12-18 PROCEDURE — 99213 OFFICE O/P EST LOW 20 MIN: CPT

## 2024-12-19 RX ORDER — PREDNISONE 10 MG/1
10 TABLET ORAL
Qty: 21 | Refills: 0 | Status: ACTIVE | COMMUNITY
Start: 2024-12-19 | End: 1900-01-01

## 2025-02-27 ENCOUNTER — APPOINTMENT (OUTPATIENT)
Dept: SLEEP CENTER | Facility: HOME HEALTH | Age: 30
End: 2025-02-27

## 2025-02-27 ENCOUNTER — OUTPATIENT (OUTPATIENT)
Dept: OUTPATIENT SERVICES | Facility: HOSPITAL | Age: 30
LOS: 1 days | End: 2025-02-27

## 2025-02-27 DIAGNOSIS — G47.33 OBSTRUCTIVE SLEEP APNEA (ADULT) (PEDIATRIC): ICD-10-CM

## 2025-02-27 PROCEDURE — 95800 SLP STDY UNATTENDED: CPT

## 2025-02-27 PROCEDURE — 95800 SLP STDY UNATTENDED: CPT | Mod: 26

## 2025-07-08 ENCOUNTER — APPOINTMENT (OUTPATIENT)
Dept: PULMONOLOGY | Facility: CLINIC | Age: 30
End: 2025-07-08

## 2025-07-25 ENCOUNTER — APPOINTMENT (OUTPATIENT)
Dept: PULMONOLOGY | Facility: CLINIC | Age: 30
End: 2025-07-25
Payer: COMMERCIAL

## 2025-07-25 VITALS
WEIGHT: 186 LBS | SYSTOLIC BLOOD PRESSURE: 116 MMHG | DIASTOLIC BLOOD PRESSURE: 76 MMHG | HEIGHT: 75 IN | OXYGEN SATURATION: 98 % | BODY MASS INDEX: 23.13 KG/M2 | RESPIRATION RATE: 16 BRPM | TEMPERATURE: 97.2 F | HEART RATE: 51 BPM

## 2025-07-25 PROCEDURE — 94729 DIFFUSING CAPACITY: CPT

## 2025-07-25 PROCEDURE — 94727 GAS DIL/WSHOT DETER LNG VOL: CPT

## 2025-07-25 PROCEDURE — 99214 OFFICE O/P EST MOD 30 MIN: CPT | Mod: 25

## 2025-07-25 PROCEDURE — 94010 BREATHING CAPACITY TEST: CPT
